# Patient Record
Sex: MALE
[De-identification: names, ages, dates, MRNs, and addresses within clinical notes are randomized per-mention and may not be internally consistent; named-entity substitution may affect disease eponyms.]

---

## 2021-01-13 ENCOUNTER — RX ONLY (RX ONLY)
Age: 31
End: 2021-01-13

## 2021-01-13 ENCOUNTER — APPOINTMENT (OUTPATIENT)
Dept: URBAN - METROPOLITAN AREA SURGERY 9 | Age: 31
Setting detail: DERMATOLOGY
End: 2021-01-13

## 2021-01-13 DIAGNOSIS — L30.4 ERYTHEMA INTERTRIGO: ICD-10-CM

## 2021-01-13 DIAGNOSIS — L21.8 OTHER SEBORRHEIC DERMATITIS: ICD-10-CM

## 2021-01-13 PROCEDURE — OTHER COUNSELING: OTHER

## 2021-01-13 PROCEDURE — 99203 OFFICE O/P NEW LOW 30 MIN: CPT

## 2021-01-13 PROCEDURE — OTHER PRESCRIPTION: OTHER

## 2021-01-13 PROCEDURE — OTHER TREATMENT REGIMEN: OTHER

## 2021-01-13 RX ORDER — KETOCONAZOLE 20 MG/G
CREAM TOPICAL
Qty: 1 | Refills: 2 | Status: ERX | COMMUNITY
Start: 2021-01-13

## 2021-01-13 RX ORDER — HYDROCORTISONE 25 MG/G
CREAM TOPICAL BID
Qty: 1 | Refills: 3 | Status: ERX | COMMUNITY
Start: 2021-01-13

## 2021-01-13 RX ORDER — KETOCONAZOLE 20 MG/ML
SHAMPOO TOPICAL
Qty: 1 | Refills: 3 | Status: ERX | COMMUNITY
Start: 2021-01-13

## 2021-01-13 RX ORDER — HYDROCORTISONE 25 MG/G
CREAM TOPICAL BID
Qty: 1 | Refills: 3 | Status: CANCELLED
Stop reason: CLARIF

## 2021-01-13 ASSESSMENT — LOCATION ZONE DERM
LOCATION ZONE: SCALP
LOCATION ZONE: TRUNK

## 2021-01-13 ASSESSMENT — LOCATION SIMPLE DESCRIPTION DERM
LOCATION SIMPLE: LOWER BACK
LOCATION SIMPLE: GLUTEAL CLEFT
LOCATION SIMPLE: SCALP

## 2021-01-13 ASSESSMENT — LOCATION DETAILED DESCRIPTION DERM
LOCATION DETAILED: INFERIOR LUMBAR SPINE
LOCATION DETAILED: GLUTEAL CLEFT
LOCATION DETAILED: RIGHT SUPERIOR PARIETAL SCALP

## 2021-01-13 NOTE — PROCEDURE: TREATMENT REGIMEN
Detail Level: Zone
Initiate Treatment: Ketoconzole shampoo daily during flares, 2-3 x/week during maintenance.
Plan: Call if this is not resolving.
Initiate Treatment: Mix ketoconazole 2% cream and hydrocortisone 2.5% cream twice daily x 2 weeks then as needed for flares

## 2021-01-13 NOTE — HPI: RASH
What Type Of Note Output Would You Prefer (Optional)?: Standard Output
Is The Patient Presenting As Previously Scheduled?: Yes
How Severe Is Your Rash?: mild
Is This A New Presentation, Or A Follow-Up?: Rash
Additional History: Patient states had pilonidal cyst removed 2 years ago, and stated he noticed wetness and itching about 1 year ago and general surgery stated it had not returned and looked like a severe rash.